# Patient Record
Sex: FEMALE
[De-identification: names, ages, dates, MRNs, and addresses within clinical notes are randomized per-mention and may not be internally consistent; named-entity substitution may affect disease eponyms.]

---

## 2018-07-02 ENCOUNTER — HOSPITAL ENCOUNTER (EMERGENCY)
Dept: HOSPITAL 5 - ED | Age: 23
LOS: 1 days | Discharge: HOME | End: 2018-07-03
Payer: MEDICAID

## 2018-07-02 VITALS — SYSTOLIC BLOOD PRESSURE: 141 MMHG | DIASTOLIC BLOOD PRESSURE: 78 MMHG

## 2018-07-02 DIAGNOSIS — F17.200: ICD-10-CM

## 2018-07-02 DIAGNOSIS — L02.31: Primary | ICD-10-CM

## 2018-07-02 DIAGNOSIS — L03.317: ICD-10-CM

## 2018-07-02 PROCEDURE — 99282 EMERGENCY DEPT VISIT SF MDM: CPT

## 2018-07-03 NOTE — EMERGENCY DEPARTMENT REPORT
Abscess Boil [facility]





- [facility]


Chief Complaint: Animal Bite


Stated Complaint: INSECT BITE


Time Seen by Provider: 07/02/18 23:25


Duration: 4 Days


Location: Other (left buttock)


History: Yes Pain, Yes Purulent Drainage, No Fever, No Numbness, No Foreign Body

, No Previous History, No Insect Bite


HPI: 22-year-old  female comes in presents to the emergency room with 

complaint of possible spider bite to her left lower buttocks 4 days.  Patient 

reports the pain as a 10 out of 10 when she sits down or with movement.  

Patient states that the area was oozing blood with brownish discharge.  Patient 

reports no past medical history currently takes no medications on a daily basis 

and has no known drug allergies


Home Medications: 


 Previous Rx's











 Medication  Instructions  Recorded  Last Taken  Type


 


Ibuprofen [Motrin 800 MG tab] 800 mg PO Q8HR PRN #30 tablet 07/03/18 Unknown Rx


 


Sulfamethoxazole/Trimethoprim 1 each PO BID #20 tablet 07/03/18 Unknown Rx





[Bactrim DS TAB]    











Allergies/Adverse Reactions: 


 Allergies











Allergy/AdvReac Type Severity Reaction Status Date / Time


 


No Known Allergies Allergy   Verified 07/02/18 20:41














ED Review of Systems


ROS: 


Stated complaint: INSECT BITE


Other details as noted in HPI








ED Past Medical Hx





- Past Medical History


Previous Medical History?: No


Additional medical history: preeclampsia





- Surgical History


Past Surgical History?: No





- Social History


Smoking Status: Current Every Day Smoker


Substance Use Type: Alcohol





- Medications


Home Medications: 


 Home Medications











 Medication  Instructions  Recorded  Confirmed  Last Taken  Type


 


Ibuprofen [Motrin 800 MG tab] 800 mg PO Q8HR PRN #30 tablet 07/03/18  Unknown Rx


 


Sulfamethoxazole/Trimethoprim 1 each PO BID #20 tablet 07/03/18  Unknown Rx





[Bactrim DS TAB]     














ED Abscess Boil Physical Exam





- Exam


General: 


Vital signs noted. No distress. Alert and acting appropriately.





Size: >5 cm


Exam: Yes Tenderness, Yes Fluctuance (somewhat), Yes Surrounding Cellulites/

Erythema, Yes Normal Neurologic Exam, Yes Normal Circulation, No Lymphangitis, 

No Crepitation





I & D Note





- I & D Note


I & D Note: Area was clean with Betadine 2 mL of lidocaine 2% injected into the 

most fluctuant area a 1/2 cm incision made with a scalpel 11 blade.  Purulent 

discharge of approximately 5 mL.  Bandage with sterile dressing.  Recent 

tolerated procedure well





ED Course


 Vital Signs











  07/02/18





  20:41


 


Temperature 98.5 F


 


Pulse Rate 87


 


Respiratory 18





Rate 


 


Blood Pressure 141/78


 


O2 Sat by Pulse 99





Oximetry 











Critical care attestation.: 


If time is entered above; I have spent that time in minutes in the direct care 

of this critically ill patient, excluding procedure time.








ED Medical Decision Making





- Medical Decision Making





She has been evaluated by this provider fast track.


Incision and drained abscess with surrounding cellulititis


Ibuprofen 800 mg given for pain management.


Discharge patient on Bactrim double strength 1 tablet by mouth twice a day 10 

days.


Ibuprofen 800 mg by mouth every 8 hours for 10 days given.  


Discussed patient to continue warm compresses to the area complete antibiotics 

and take pain medication as needed.  


Discussed the patient she can follow up with [facility].





ED Disposition


Clinical Impression: 


 Cellulitis and abscess of buttock





Disposition: DC-01 TO HOME OR SELFCARE


Is pt being admited?: No


Does the pt Need Aspirin: No


Condition: Stable


Instructions:  Sulfamethoxazole/Trimethoprim (By mouth), Abscess Incision and 

Drainage (ED), Abscess (ED)


Additional Instructions: 


Complete antibiotics as prescribed.  Take pain medication as needed.  Continue 

warm compresses and change bandage daily.  Follow up with your primary care 

provider or [facility] in 3-5 days for recheck.


Prescriptions: 


Ibuprofen [Motrin 800 MG tab] 800 mg PO Q8HR PRN #30 tablet


 PRN Reason: Pain , Severe (7-10)


Sulfamethoxazole/Trimethoprim [Bactrim DS TAB] 1 each PO BID #20 tablet


Referrals: 


[facility] [Primary Care Provider] - 3-5 Days


[facility] [Provider Group] - 3-5 Days


Forms:  Work/School Release Form(ED)

## 2019-01-09 ENCOUNTER — HOSPITAL ENCOUNTER (EMERGENCY)
Dept: HOSPITAL 5 - ED | Age: 24
LOS: 1 days | Discharge: HOME | End: 2019-01-10
Payer: MEDICAID

## 2019-01-09 VITALS — DIASTOLIC BLOOD PRESSURE: 83 MMHG | SYSTOLIC BLOOD PRESSURE: 165 MMHG

## 2019-01-09 DIAGNOSIS — Z3A.00: ICD-10-CM

## 2019-01-09 DIAGNOSIS — O26.891: ICD-10-CM

## 2019-01-09 DIAGNOSIS — N83.201: ICD-10-CM

## 2019-01-09 DIAGNOSIS — I10: ICD-10-CM

## 2019-01-09 DIAGNOSIS — O20.0: Primary | ICD-10-CM

## 2019-01-09 LAB
BACTERIA #/AREA URNS HPF: (no result) /HPF
BILIRUB UR QL STRIP: (no result)
BLOOD UR QL VISUAL: (no result)
BUN SERPL-MCNC: 10 MG/DL (ref 7–17)
BUN/CREAT SERPL: 14 %
CALCIUM SERPL-MCNC: 9.3 MG/DL (ref 8.4–10.2)
HCT VFR BLD CALC: 40.9 % (ref 30.3–42.9)
HEMOLYSIS INDEX: 7
HGB BLD-MCNC: 13.8 GM/DL (ref 10.1–14.3)
MCHC RBC AUTO-ENTMCNC: 34 % (ref 30–34)
MCV RBC AUTO: 89 FL (ref 79–97)
MUCOUS THREADS #/AREA URNS HPF: (no result) /HPF
PH UR STRIP: 6 [PH] (ref 5–7)
PLATELET # BLD: 338 K/MM3 (ref 140–440)
PROT UR STRIP-MCNC: (no result) MG/DL
RBC # BLD AUTO: 4.57 M/MM3 (ref 3.65–5.03)
RBC #/AREA URNS HPF: 2 /HPF (ref 0–6)
UROBILINOGEN UR-MCNC: < 2 MG/DL (ref ?–2)
WBC #/AREA URNS HPF: 4 /HPF (ref 0–6)

## 2019-01-09 PROCEDURE — 76817 TRANSVAGINAL US OBSTETRIC: CPT

## 2019-01-09 PROCEDURE — 85027 COMPLETE CBC AUTOMATED: CPT

## 2019-01-09 PROCEDURE — 80048 BASIC METABOLIC PNL TOTAL CA: CPT

## 2019-01-09 PROCEDURE — 36415 COLL VENOUS BLD VENIPUNCTURE: CPT

## 2019-01-09 PROCEDURE — 84702 CHORIONIC GONADOTROPIN TEST: CPT

## 2019-01-09 PROCEDURE — 99284 EMERGENCY DEPT VISIT MOD MDM: CPT

## 2019-01-09 PROCEDURE — 81001 URINALYSIS AUTO W/SCOPE: CPT

## 2019-01-09 PROCEDURE — 76801 OB US < 14 WKS SINGLE FETUS: CPT

## 2019-01-09 NOTE — EMERGENCY DEPARTMENT REPORT
ED Female  HPI





- General


Chief complaint: Abdominal Pain


Stated complaint: 1MONTH PREG/CRAMPING


Source: patient


Mode of arrival: Ambulatory


Limitations: No Limitations





- History of Present Illness


Initial comments: 





This is a 23-year-old  female who presents with abdominal cramping and 

pregnancy.  Patient states she has taken several pregnancy over the past 4 days 

which were all positive.  Her last menstrual period was 12/10/2018, , A1.  

Patient reports diffuse abdominal cramping for 2 days.  She denies vaginal 

bleeding.  Reports her prior pregnancy history of preeclampsia and one 

miscarriage.  His chest pain, shortness of breath, no lightheadedness, nausea or

vomiting, vaginal bleeding, vaginal discharge, frequency, urgency, or dysuria.


MD Complaint: other (abdominal cramping)


Onset/Timin


-: days(s)


Radiation: non-radiating


Severity: mild


Severity scale (0 -10): 3


Quality: cramping


Consistency: intermittent


Improves with: none


Worsens with: none


Are you Pregnant Now?: Yes


Last Menstrual Period: 12/10/18


EDC: 19


Associated Symptoms: abdominal pain.  denies: vaginal discharge, vaginal blee

ding, nausea/vomiting, fever/chills, headaches, loss of appetite, dysuria, 

hematuria, rash, seizure, shortness of breath, syncope, weakness





- Related Data


Sexually active: Yes


: 4


Para: 2


A: 1 (miscarriage)


                                  Previous Rx's











 Medication  Instructions  Recorded  Last Taken  Type


 


Ibuprofen [Motrin 800 MG tab] 800 mg PO Q8HR PRN #30 tablet 18 Unknown Rx


 


Sulfamethoxazole/Trimethoprim 1 each PO BID #20 tablet 18 Unknown Rx





[Bactrim DS TAB]    











                                    Allergies











Allergy/AdvReac Type Severity Reaction Status Date / Time


 


No Known Allergies Allergy   Verified 18 20:41














ED Review of Systems


ROS: 


Stated complaint: 1MONTH PREG/CRAMPING


Other details as noted in HPI





Constitutional: denies: chills, fever


Respiratory: denies: cough, shortness of breath, wheezing


Cardiovascular: denies: chest pain, palpitations


Gastrointestinal: abdominal pain (diffuse abdominal cramping).  denies: nausea, 

diarrhea


Genitourinary: denies: urgency, dysuria, discharge


Musculoskeletal: denies: back pain, joint swelling, arthralgia


Neurological: denies: headache, weakness, paresthesias


Psychiatric: denies: anxiety, depression





ED Past Medical Hx





- Past Medical History


Hx Hypertension: Yes (not on meds)


Additional medical history: preeclampsia,low KT





- Surgical History


Past Surgical History?: No


Additional Surgical History: vaginal delivery x2





- Social History


Smoking Status: Never Smoker


Substance Use Type: None





- Medications


Home Medications: 


                                Home Medications











 Medication  Instructions  Recorded  Confirmed  Last Taken  Type


 


Ibuprofen [Motrin 800 MG tab] 800 mg PO Q8HR PRN #30 tablet 18  Unknown Rx


 


Sulfamethoxazole/Trimethoprim 1 each PO BID #20 tablet 18  Unknown Rx





[Bactrim DS TAB]     














ED Physical Exam





- General


Limitations: No Limitations


General appearance: alert, in no apparent distress, obese





- Respiratory


Respiratory exam: Present: normal lung sounds bilaterally.  Absent: respiratory 

distress





- Cardiovascular


Cardiovascular Exam: Present: regular rate, normal rhythm.  Absent: systolic 

murmur, diastolic murmur, rubs, gallop





- GI/Abdominal


GI/Abdominal exam: Present: soft, normal bowel sounds.  Absent: distended, 

tenderness, guarding, rebound, rigid, organomegaly, mass





- Back Exam


Back exam: Absent: CVA tenderness (R), CVA tenderness (L)





- Neurological Exam


Neurological exam: Present: alert, oriented X3





- Psychiatric


Psychiatric exam: Present: normal affect, normal mood





- Skin


Skin exam: Present: warm, dry, intact, normal color.  Absent: rash





ED Course


                                   Vital Signs











  19





  17:56


 


Temperature 97.9 F


 


Pulse Rate 108 H


 


Respiratory 18





Rate 


 


Blood Pressure 165/83


 


O2 Sat by Pulse 100





Oximetry 














ED Medical Decision Making





- Lab Data


Result diagrams: 


                                 19 18:07





                                 19 18:07








                                   Lab Results











  19 Range/Units





  18:07 18:07 18:07 


 


WBC  9.6    (4.5-11.0)  K/mm3


 


RBC  4.57    (3.65-5.03)  M/mm3


 


Hgb  13.8    (10.1-14.3)  gm/dl


 


Hct  40.9    (30.3-42.9)  %


 


MCV  89    (79-97)  fl


 


MCH  30    (28-32)  pg


 


MCHC  34    (30-34)  %


 


RDW  12.8 L    (13.2-15.2)  %


 


Plt Count  338    (140-440)  K/mm3


 


Sodium   139   (137-145)  mmol/L


 


Potassium   3.9   (3.6-5.0)  mmol/L


 


Chloride   101.4   ()  mmol/L


 


Carbon Dioxide   26   (22-30)  mmol/L


 


Anion Gap   16   mmol/L


 


BUN   10   (7-17)  mg/dL


 


Creatinine   0.7   (0.7-1.2)  mg/dL


 


Estimated GFR   > 60   ml/min


 


BUN/Creatinine Ratio   14   %


 


Glucose   83   ()  mg/dL


 


Calcium   9.3   (8.4-10.2)  mg/dL


 


HCG, Quant    244.9 H  (0-4)  mIU/mL


 


Urine Color     (Yellow)  


 


Urine Turbidity     (Clear)  


 


Urine pH     (5.0-7.0)  


 


Ur Specific Gravity     (1.003-1.030)  


 


Urine Protein     (Negative)  mg/dL


 


Urine Glucose (UA)     (Negative)  mg/dL


 


Urine Ketones     (Negative)  mg/dL


 


Urine Blood     (Negative)  


 


Urine Nitrite     (Negative)  


 


Urine Bilirubin     (Negative)  


 


Urine Urobilinogen     (<2.0)  mg/dL


 


Ur Leukocyte Esterase     (Negative)  


 


Urine WBC (Auto)     (0.0-6.0)  /HPF


 


Urine RBC (Auto)     (0.0-6.0)  /HPF


 


U Epithel Cells (Auto)     (0-13.0)  /HPF


 


Urine Bacteria (Auto)     (Negative)  /HPF


 


Urine Mucus     /HPF














  19 Range/Units





  19:00 


 


WBC   (4.5-11.0)  K/mm3


 


RBC   (3.65-5.03)  M/mm3


 


Hgb   (10.1-14.3)  gm/dl


 


Hct   (30.3-42.9)  %


 


MCV   (79-97)  fl


 


MCH   (28-32)  pg


 


MCHC   (30-34)  %


 


RDW   (13.2-15.2)  %


 


Plt Count   (140-440)  K/mm3


 


Sodium   (137-145)  mmol/L


 


Potassium   (3.6-5.0)  mmol/L


 


Chloride   ()  mmol/L


 


Carbon Dioxide   (22-30)  mmol/L


 


Anion Gap   mmol/L


 


BUN   (7-17)  mg/dL


 


Creatinine   (0.7-1.2)  mg/dL


 


Estimated GFR   ml/min


 


BUN/Creatinine Ratio   %


 


Glucose   ()  mg/dL


 


Calcium   (8.4-10.2)  mg/dL


 


HCG, Quant   (0-4)  mIU/mL


 


Urine Color  Yellow  (Yellow)  


 


Urine Turbidity  Clear  (Clear)  


 


Urine pH  6.0  (5.0-7.0)  


 


Ur Specific Gravity  1.019  (1.003-1.030)  


 


Urine Protein  <15 mg/dl  (Negative)  mg/dL


 


Urine Glucose (UA)  Neg  (Negative)  mg/dL


 


Urine Ketones  Neg  (Negative)  mg/dL


 


Urine Blood  Sm  (Negative)  


 


Urine Nitrite  Neg  (Negative)  


 


Urine Bilirubin  Neg  (Negative)  


 


Urine Urobilinogen  < 2.0  (<2.0)  mg/dL


 


Ur Leukocyte Esterase  Mod  (Negative)  


 


Urine WBC (Auto)  4.0  (0.0-6.0)  /HPF


 


Urine RBC (Auto)  2.0  (0.0-6.0)  /HPF


 


U Epithel Cells (Auto)  2.0  (0-13.0)  /HPF


 


Urine Bacteria (Auto)  1+  (Negative)  /HPF


 


Urine Mucus  Few  /HPF














- Radiology Data


Radiology results: report reviewed





EXAM: US OB Trans-Vaginal 





CLINICAL INDICATIONS: ABD CRAMPING, POSITIVE PREGNANCY 





FINDINGS: 





Real-time ultrasound the pelvis was performed with transabdominal and 

endovaginal technique. 





The uterus measures 8.7 x 4.6 x 6.2 cm. The endometrial stripe measured is 

thickened at 1.48 cm. 


No intrauterine gestation is seen. 





The right ovary measures 2.6 x 2.3 x 2.0 cm and contains a cyst measuring 1.6 

cm. The left ovary 


measures 4.2 x 2.5 x 2.0 cm and contains a presumed hemorrhagic cyst measuring 

1.7 x 1.7 x 1.6 cm. 


This does not appear peripherally hypervascular. 





IMPRESSION: 





NO INTRAUTERINE GESTATION IS SEEN 





RIGHT OVARIAN CYST AND LEFT REPRESENT HEMORRHAGIC CYST. NO DEFINITE ADNEXAL MASS

IS SEEN. ECTOPIC


GESTATION IS NOT EXCLUDED IN THE ABSENCE OF INTRAUTERINE GESTATION. FOLLOW-UP 

ULTRASOUND AND/OR 


QUANTITATIVE HCG ARE RECOMMENDED 





- Medical Decision Making





This is a 23 y.o. female presents with abdominal pain during pregnancy for 2 

days.  Patient was examined by me.  Vitals are normal and patient is in no acute

distress.  Obtained labs and OB ultrasound.  Quant 244.9 all other labs 

unremarkable.  1. No demonstration of an intrauterine gestation. In the absence 

of demonstration of an intrauterine gestation an ectopic gestation cannot be 

excluded. Short-term follow-up in correlation with beta HCG is recommended. 2. 

Demonstration of free fluid in the cul-de-sac.  Patient instructed to have 

repeat hCG quant in 48 hours with OB/GYN or in ER to r/o ectopic pregnancy.  

Patient discharged home in stable condition. 


Critical care attestation.: 


If time is entered above; I have spent that time in minutes in the direct care 

of this critically ill patient, excluding procedure time.








ED Disposition


Clinical Impression: 


 Abdominal pain affecting pregnancy, Threatened miscarriage





Ovarian cyst


Qualifiers:


 Laterality: bilateral Qualified Code(s): N83.201 - Unspecified ovarian cyst, 

right side





Disposition:  TO HOME OR SELFCARE


Is pt being admited?: No


Does the pt Need Aspirin: No


Condition: Stable


Instructions:  Threatened Miscarriage (ED), Abdominal Pain (ED)


Additional Instructions: 


Have repeat hCG quant labs in 48 hours with OB/GYN or ER.





Your hCG quantitative on this visit was 244.9.





Remain on bed rest.





Follow up with OB/GYN in 24-48 hours.





Return to ER if increased vaginal bleeding, abdominal pain, and low back pain.


Referrals: 


[facility] [Provider Group] - 3-5 Days


[facility] [Provider Group] - 3-5 Days


[facility] [Provider Group] - 3-5 Days


Forms:  Accompanied Note, Work/School Release Form(ED)


Time of Disposition: 01:34

## 2019-01-10 NOTE — ULTRASOUND REPORT
FINAL REPORT



EXAM: US OB Trans-Vaginal 



CLINICAL INDICATIONS: ABD CRAMPING, POSITIVE PREGNANCY



FINDINGS: 



Real-time ultrasound the pelvis was performed with transabdominal and endovaginal technique.



The uterus measures 8.7 x 4.6 x 6.2 cm.  The endometrial stripe measured is thickened at 1.48 cm.  No
 intrauterine gestation is seen.



The right ovary measures 2.6 x 2.3 x 2.0 cm and contains a cyst measuring 1.6 cm.  The left ovary sharla
sures 4.2 x 2.5 x 2.0 cm and contains a presumed hemorrhagic cyst measuring 1.7 x 1.7 x 1.6 cm.  This
 does not appear peripherally hypervascular.



IMPRESSION:



NO INTRAUTERINE GESTATION IS SEEN



RIGHT OVARIAN CYST AND LEFT REPRESENT HEMORRHAGIC CYST.  NO DEFINITE ADNEXAL MASS IS SEEN.  ECTOPIC G
ESTATION IS NOT EXCLUDED IN THE ABSENCE OF INTRAUTERINE GESTATION.  FOLLOW-UP ULTRASOUND AND/OR QUANT
ITATIVE HCG ARE RECOMMENDED